# Patient Record
Sex: FEMALE | Race: WHITE | NOT HISPANIC OR LATINO | URBAN - METROPOLITAN AREA
[De-identification: names, ages, dates, MRNs, and addresses within clinical notes are randomized per-mention and may not be internally consistent; named-entity substitution may affect disease eponyms.]

---

## 2021-08-25 ENCOUNTER — TELEPHONE ENCOUNTER (OUTPATIENT)
Dept: URBAN - METROPOLITAN AREA CLINIC 35 | Facility: CLINIC | Age: 31
End: 2021-08-25

## 2021-11-16 ENCOUNTER — OFFICE VISIT (OUTPATIENT)
Dept: URBAN - METROPOLITAN AREA CLINIC 96 | Facility: CLINIC | Age: 31
End: 2021-11-16

## 2022-03-02 ENCOUNTER — OFFICE VISIT (OUTPATIENT)
Dept: URBAN - METROPOLITAN AREA CLINIC 96 | Facility: CLINIC | Age: 32
End: 2022-03-02
Payer: COMMERCIAL

## 2022-03-02 ENCOUNTER — WEB ENCOUNTER (OUTPATIENT)
Dept: URBAN - METROPOLITAN AREA CLINIC 96 | Facility: CLINIC | Age: 32
End: 2022-03-02

## 2022-03-02 VITALS
DIASTOLIC BLOOD PRESSURE: 68 MMHG | SYSTOLIC BLOOD PRESSURE: 121 MMHG | HEART RATE: 94 BPM | HEIGHT: 62 IN | BODY MASS INDEX: 19.88 KG/M2 | TEMPERATURE: 98.5 F | WEIGHT: 108 LBS

## 2022-03-02 DIAGNOSIS — E55.9 VITAMIN D DEFICIENCY: ICD-10-CM

## 2022-03-02 DIAGNOSIS — K62.5 RECTAL BLEEDING: ICD-10-CM

## 2022-03-02 DIAGNOSIS — K51.90 ULCERATIVE COLITIS WITHOUT COMPLICATIONS, UNSPECIFIED LOCATION: ICD-10-CM

## 2022-03-02 DIAGNOSIS — R19.4 CHANGE IN BOWEL HABIT: ICD-10-CM

## 2022-03-02 DIAGNOSIS — R19.7 DIARRHEA, UNSPECIFIED TYPE: ICD-10-CM

## 2022-03-02 DIAGNOSIS — D50.9 IRON DEFICIENCY ANEMIA, UNSPECIFIED IRON DEFICIENCY ANEMIA TYPE: ICD-10-CM

## 2022-03-02 PROBLEM — 34713006: Status: ACTIVE | Noted: 2022-03-02

## 2022-03-02 PROBLEM — 64766004: Status: ACTIVE | Noted: 2022-03-02

## 2022-03-02 PROBLEM — 87522002: Status: ACTIVE | Noted: 2022-03-02

## 2022-03-02 PROCEDURE — 99204 OFFICE O/P NEW MOD 45 MIN: CPT | Performed by: INTERNAL MEDICINE

## 2022-03-02 RX ORDER — MESALAMINE 1.2 G/1
4 TABLET, DELAYED RELEASE ORAL ONCE A DAY
Qty: 360 | Refills: 4 | OUTPATIENT
Start: 2022-03-02 | End: 2023-05-26

## 2022-03-02 RX ORDER — SODIUM PICOSULFATE, MAGNESIUM OXIDE, AND ANHYDROUS CITRIC ACID 10; 3.5; 12 MG/160ML; G/160ML; G/160ML
160 ML LIQUID ORAL
Qty: 320 MILLILITER | Refills: 0 | OUTPATIENT
Start: 2022-03-02 | End: 2022-03-03

## 2022-03-02 RX ORDER — MESALAMINE 1000 MG/1
1 SUPPOSITORY AT BEDTIME SUPPOSITORY RECTAL ONCE A DAY
Qty: 30 | Refills: 0 | OUTPATIENT
Start: 2022-03-02 | End: 2022-04-01

## 2022-03-02 RX ORDER — MESALAMINE 1.2 G/1
4 TABLET, DELAYED RELEASE ORAL ONCE A DAY
Status: ACTIVE | COMMUNITY

## 2022-03-02 NOTE — HPI-TODAY'S VISIT:
32 yo female self referred for ulcerative colitis.   Patient reports was diagnosed with UC in 2017 after presenting with diarrhea and rectal bleeding.  Patient reports was told she had disease distribution "a tiny bit at the bottom and a tiny bit at the top". Has been on Lialda since dx--4 tablets daily. Labs normal on such per patient. No side effects with such. Reports hx of "low iron" and low vitamin D. Labs last checked within the past 6 months. One and only colonoscopy done 2017 at time of diagnosis. No prior need for steroid therapy.   Does report needing Canasa 2 years ago--was on such for 30 days.   Moved from [place]. Has been in [place] since last year,  1/2022. Honeymoon in [place].    Patient worried she is having a flare. Reports over the past 3 weeks was having light rectal bleeding with looser stools. Scant BRBPR. Improved over the past 2 days. No fatigue, no f/c, no recent antibiotics. Returned from [place] and was well during the trip. No antibiotics, no sick exposure. Thinks she had COVID in 12/2021 due to spouse illness. She was tired and had HA with no GI sx. No chance pregnant.   No NSAIDs. No prior hx of C diff infection.  No chance pregnant. UTD with GYN. No n/v, no unintentional weight loss. Good po intake.   No food allergies, onion will hurt her stomach.

## 2022-03-03 LAB
A/G RATIO: 1.9
ALBUMIN: 4.9
ALKALINE PHOSPHATASE: 71
ALT (SGPT): 14
AST (SGOT): 20
BILIRUBIN, TOTAL: <0.2
BUN/CREATININE RATIO: 29
BUN: 18
C-REACTIVE PROTEIN, QUANT: 2
CALCIUM: 9.6
CARBON DIOXIDE, TOTAL: 21
CHLORIDE: 102
CREATININE: 0.62
EGFR: 121
FERRITIN, SERUM: 87
GLOBULIN, TOTAL: 2.6
GLUCOSE: 88
HEMATOCRIT: 41.5
HEMOGLOBIN: 13.7
IRON BIND.CAP.(TIBC): 284
IRON SATURATION: 15
IRON: 44
MCH: 30.2
MCHC: 33
MCV: 91
NRBC: (no result)
PLATELETS: 311
POTASSIUM: 4.9
PROTEIN, TOTAL: 7.5
RBC: 4.54
RDW: 11.8
SODIUM: 138
UIBC: 240
VITAMIN D, 25-HYDROXY: 28.4
WBC: 9.1

## 2022-03-04 ENCOUNTER — OFFICE VISIT (OUTPATIENT)
Dept: URBAN - METROPOLITAN AREA SURGERY CENTER 18 | Facility: SURGERY CENTER | Age: 32
End: 2022-03-04

## 2022-03-23 ENCOUNTER — WEB ENCOUNTER (OUTPATIENT)
Dept: URBAN - METROPOLITAN AREA SURGERY CENTER 18 | Facility: SURGERY CENTER | Age: 32
End: 2022-03-23

## 2022-03-25 ENCOUNTER — CLAIMS CREATED FROM THE CLAIM WINDOW (OUTPATIENT)
Dept: URBAN - METROPOLITAN AREA CLINIC 4 | Facility: CLINIC | Age: 32
End: 2022-03-25
Payer: COMMERCIAL

## 2022-03-25 ENCOUNTER — OFFICE VISIT (OUTPATIENT)
Dept: URBAN - METROPOLITAN AREA SURGERY CENTER 18 | Facility: SURGERY CENTER | Age: 32
End: 2022-03-25
Payer: COMMERCIAL

## 2022-03-25 DIAGNOSIS — K51.919 ULCERATIVE COLITIS, UNSPECIFIED WITH UNSPECIFIED COMPLICATIONS: ICD-10-CM

## 2022-03-25 DIAGNOSIS — K63.89 CYST OF DUODENUM: ICD-10-CM

## 2022-03-25 DIAGNOSIS — K31.89 FOCAL FOVEOLAR HYPERPLASIA: ICD-10-CM

## 2022-03-25 DIAGNOSIS — R19.7 ACUTE DIARRHEA: ICD-10-CM

## 2022-03-25 DIAGNOSIS — K51.811 OTHER ULCERATIVE COLITIS WITH RECTAL BLEEDING: ICD-10-CM

## 2022-03-25 PROCEDURE — 45380 COLONOSCOPY AND BIOPSY: CPT | Performed by: INTERNAL MEDICINE

## 2022-03-25 PROCEDURE — G8907 PT DOC NO EVENTS ON DISCHARG: HCPCS | Performed by: INTERNAL MEDICINE

## 2022-03-25 PROCEDURE — 88305 TISSUE EXAM BY PATHOLOGIST: CPT | Performed by: PATHOLOGY

## 2022-03-25 RX ORDER — MESALAMINE 1000 MG/1
1 SUPPOSITORY AT BEDTIME SUPPOSITORY RECTAL ONCE A DAY
Qty: 30 | Refills: 0 | Status: ACTIVE | COMMUNITY
Start: 2022-03-02 | End: 2022-04-01

## 2022-03-25 RX ORDER — MESALAMINE 1.2 G/1
4 TABLET, DELAYED RELEASE ORAL ONCE A DAY
Status: ACTIVE | COMMUNITY

## 2022-03-25 RX ORDER — MESALAMINE 1.2 G/1
4 TABLET, DELAYED RELEASE ORAL ONCE A DAY
Qty: 360 | Refills: 4 | Status: ACTIVE | COMMUNITY
Start: 2022-03-02 | End: 2023-05-26

## 2022-03-30 ENCOUNTER — OFFICE VISIT (OUTPATIENT)
Dept: URBAN - METROPOLITAN AREA CLINIC 12 | Facility: CLINIC | Age: 32
End: 2022-03-30

## 2023-04-12 ENCOUNTER — TELEPHONE ENCOUNTER (OUTPATIENT)
Dept: URBAN - METROPOLITAN AREA CLINIC 105 | Facility: CLINIC | Age: 33
End: 2023-04-12

## 2023-04-14 ENCOUNTER — OFFICE VISIT (OUTPATIENT)
Dept: URBAN - METROPOLITAN AREA TELEHEALTH 2 | Facility: TELEHEALTH | Age: 33
End: 2023-04-14
Payer: COMMERCIAL

## 2023-04-14 VITALS — BODY MASS INDEX: 25.76 KG/M2 | HEIGHT: 62 IN | WEIGHT: 140 LBS

## 2023-04-14 DIAGNOSIS — E55.9 VITAMIN D DEFICIENCY: ICD-10-CM

## 2023-04-14 DIAGNOSIS — K51.80 CHRONIC PANCOLONIC ULCERATIVE COLITIS: ICD-10-CM

## 2023-04-14 DIAGNOSIS — D50.9 IRON DEFICIENCY ANEMIA, UNSPECIFIED IRON DEFICIENCY ANEMIA TYPE: ICD-10-CM

## 2023-04-14 PROCEDURE — 99214 OFFICE O/P EST MOD 30 MIN: CPT | Performed by: INTERNAL MEDICINE

## 2023-04-14 RX ORDER — MESALAMINE 1.2 G/1
4 TABLET, DELAYED RELEASE ORAL ONCE A DAY
Qty: 360 | Refills: 4 | Status: ACTIVE | COMMUNITY
Start: 2022-03-02 | End: 2023-05-26

## 2023-04-14 RX ORDER — MESALAMINE 1.2 G/1
4 TABLETS TABLET, DELAYED RELEASE ORAL ONCE A DAY
Qty: 360 TABLET | Refills: 0 | OUTPATIENT

## 2023-04-14 RX ORDER — MESALAMINE 1.2 G/1
4 TABLET, DELAYED RELEASE ORAL ONCE A DAY
Status: ACTIVE | COMMUNITY

## 2023-04-14 NOTE — HPI-TODAY'S VISIT:
The patient is doing telehealth today as she has a  at home.    She recently underwent colonoscopy surveillance with biopsies by Dr. Myles on 2022, with findings of normal terminal ileum.  Mildly erythematous cecal base and periappendiceal area which was biopsied.  The sigmoid, descending, transverse, and descending colon all appeared normal and random biopsies were obtained.  There was an area of diffuse, minimally erythematous mucosa in the rectosigmoid colon which was biopsied.  Also, findings of diffuse, minimally erythematous mucosa in the distal rectum, also biopsied.  Per pathology, biopsies from the terminal ileum, cecum and random biopsies were all unremarkable.  Rectosigmoid biopsies were also without significant abnormality.  Distal rectal biopsies obtained noted diffuse, chronic active colitis, consistent with history of ulcerative colitis.  No evidence of infection, dysplasia or malignancy.  Patient is requesting medication refills.  Her outpatient blood work from 2022 noted normal iron and TIBC, ferritin, CRP, CBC.  CMP essentially normal and vitamin D was borderline low.  Patient does need updated lab work to continue mesalamine therapy. Maintains she has recent labs which she can forward to our office. Doing well clinically with Lialda 4.8g/day.

## 2023-04-14 NOTE — HPI-TODAY'S VISIT:
33 yo female self referred for ulcerative colitis.   Patient reports she was diagnosed with UC in 2017 after presenting with diarrhea and rectal bleeding.  Patient reports being told she had disease distribution "a tiny bit at the bottom and a tiny bit at the top". Has been on Lialda since dx--4 tablets daily. Labs normal on such per patient. No side effects with such. Reports hx of "low iron" and low vitamin D. Labs last checked within the past 6 months. One and only colonoscopy done  at time of diagnosis. No prior need for steroid therapy.   Does report needing Canasa 2-3 years ago--was on such for 30 days.   Moved from [place] to [facility] ,  2022. Honeymoon in [place]. Now has 4mo old, Sidney, her first child. No  complications. Some fecal and urinary incontinence post partum, now resolved. No rectal bleeding.    With prior visit in , she was worried she was having a flare. Reported light rectal bleeding with looser stools. Scant BRBPR. Improved currently.  No fatigue, no f/c, no recent antibiotics.  Thinks she had COVID in 2021 due to spouse illness. She was tired and had HA with no GI sx. No chance pregnant.   No NSAIDs. No prior hx of C diff infection.  No food allergies, onion will hurt her stomach.

## 2023-04-24 ENCOUNTER — TELEPHONE ENCOUNTER (OUTPATIENT)
Dept: URBAN - METROPOLITAN AREA CLINIC 39 | Facility: CLINIC | Age: 33
End: 2023-04-24

## 2023-05-31 ENCOUNTER — OFFICE VISIT (OUTPATIENT)
Dept: URBAN - METROPOLITAN AREA CLINIC 12 | Facility: CLINIC | Age: 33
End: 2023-05-31

## 2023-06-08 ENCOUNTER — OFFICE VISIT (OUTPATIENT)
Dept: URBAN - METROPOLITAN AREA CLINIC 111 | Facility: CLINIC | Age: 33
End: 2023-06-08

## 2023-07-05 ENCOUNTER — OFFICE VISIT (OUTPATIENT)
Dept: URBAN - METROPOLITAN AREA CLINIC 111 | Facility: CLINIC | Age: 33
End: 2023-07-05

## 2023-07-31 ENCOUNTER — ERX REFILL RESPONSE (OUTPATIENT)
Dept: URBAN - METROPOLITAN AREA CLINIC 40 | Facility: CLINIC | Age: 33
End: 2023-07-31

## 2023-07-31 RX ORDER — MESALAMINE 1.2 G/1
TAKE 4 TABLETS BY MOUTH EVERY DAY TABLET, DELAYED RELEASE ORAL
Qty: 360 TABLET | Refills: 0 | OUTPATIENT

## 2023-08-10 ENCOUNTER — OFFICE VISIT (OUTPATIENT)
Dept: URBAN - METROPOLITAN AREA CLINIC 23 | Facility: CLINIC | Age: 33
End: 2023-08-10

## 2023-10-23 ENCOUNTER — ERX REFILL RESPONSE (OUTPATIENT)
Dept: URBAN - METROPOLITAN AREA CLINIC 40 | Facility: CLINIC | Age: 33
End: 2023-10-23

## 2023-10-23 RX ORDER — MESALAMINE 1.2 G/1
TAKE 4 TABLETS BY MOUTH EVERY DAY TABLET, DELAYED RELEASE ORAL
Qty: 360 TABLET | Refills: 0 | OUTPATIENT